# Patient Record
Sex: FEMALE | Race: WHITE | Employment: OTHER | URBAN - METROPOLITAN AREA
[De-identification: names, ages, dates, MRNs, and addresses within clinical notes are randomized per-mention and may not be internally consistent; named-entity substitution may affect disease eponyms.]

---

## 2019-12-30 NOTE — TELEPHONE ENCOUNTER
ONCOLOGY INTAKE: Records Information      APPT INFORMATION: 3/3/20 [place] [facility] [place]:  Dr Gomez  Referring provider s clinic:  [facility]  Reason for visit/diagnosis:  complex pelvic mass  Has patient been notified of appointment date and time?: Yes per Diane ([facility])    RECORDS INFORMATION:  Were the records received with the referral (via [employer])? No    Has patient been seen for any external appt for this diagnosis? Yes    If yes, where? [facility] ()    Has patient had any imaging or procedures outside of [place] for this condition? Yes      If Yes, where? [facility] () - Diane from [facility] is pushing pelvic ultrasound    ADDITIONAL INFORMATION:  Scheduled via call from Diane ([facility]) - she contacted pt's son Chip with appt info  Pt's son Chip will call back with pt's insurance info (Medicare)  Per Diane, pt's son requested first week in March as the patient is having another procedure elsewhere prior.

## 2019-12-31 NOTE — TELEPHONE ENCOUNTER
RECORDS STATUS - ALL OTHER DIAGNOSIS      RECORDS RECEIVED FROM: [facility]   DATE RECEIVED: 03/03/2020   NOTES STATUS DETAILS   OFFICE NOTE from referring provider YES CE-12/24/2019     OFFICE NOTE from medical oncologist NA    DISCHARGE SUMMARY from hospital NA    DISCHARGE REPORT from the ER NA    OPERATIVE REPORT NA    MEDICATION LIST YES CE   CLINICAL TRIAL TREATMENTS TO DATE NA    LABS YES CE   PATHOLOGY REPORTS NA    ANYTHING RELATED TO DIAGNOSIS NA    GENONOMIC TESTING NA    TYPE:     IMAGING (NEED IMAGES & REPORT) YES    CT SCANS NA    MRI NA    MAMMO NA    ULTRASOUND YES IN PACS  12/24/2019   PET NA

## 2020-03-02 NOTE — TELEPHONE ENCOUNTER
Pt's son called & stated Janae is ill & unable to make her appt with Dr Fulton tomorrow. Alok (son) rescheduled for 3/31/20 at 12pm (he chose date) [place] [facility] [place]

## 2020-03-03 ENCOUNTER — PRE VISIT (OUTPATIENT)
Dept: ONCOLOGY | Facility: CLINIC | Age: 85
End: 2020-03-03

## 2020-03-27 ENCOUNTER — TELEPHONE (OUTPATIENT)
Dept: ONCOLOGY | Facility: CLINIC | Age: 85
End: 2020-03-27

## 2020-03-27 NOTE — TELEPHONE ENCOUNTER
----- Message from Dyan  sent at 3/27/2020  1:35 PM CDT -----  Regarding: RE: [facility] - NEW pt 3/31  I just got off the phone with pt. She was just about to call us to cancel. She stated that she will call us to reschedule at another time, does not want a telephone/video visit at this time due to concerns regarding the pandemic.     Thanks,    [place]  Clinic Coordinator  [facility]  ----- Message -----  From: Nubia Yang, RN  Sent: 3/27/2020   1:21 PM CDT  To: Dyan Her  Subject: RE: [facility] - NEW pt 3/31                       Yep. Change to telephone [facility] [place]  ----- Message -----  From:  Dyan  Sent: 3/27/2020   1:09 PM CDT  To: Nubia Yang RN  Subject: [facility] - NEW pt 3/31                           Reggie Vela,    This is a NEW pt of Dr. Fulton's on 3/31. Dr. Fulton wants to do a video visit for this.     If pt does not have the technology or capacity to do a virtual visit, then it is our protocol to schedule the visit as a telephone visit instead.    Since this is going to be a NEW visit, would Dr. Fulton still want a telephone visit with patient if she is unable to do the video visit? Please advise.     I will call patient once I hear back from you.     Thanks,    [place]  Clinic Coordinator  [facility]